# Patient Record
(demographics unavailable — no encounter records)

---

## 2024-11-11 NOTE — HISTORY OF PRESENT ILLNESS
[de-identified] : 46 y/o female with pmhx of T2Dm, HLD, cervical radiculopathy, anxiety, COPD, current smoker presents for follow up. Patient states she is overall doing well. She is still not smoking. She is interested in increasing her dose of mounjaro to help with weight loss. She does admit to some constipation with the medication

## 2024-11-11 NOTE — PHYSICAL EXAM
[No Edema] : there was no peripheral edema [No Focal Deficits] : no focal deficits [Normal] : affect was normal and insight and judgment were intact [de-identified] : 5/5 b/l upper extremity muscle strength

## 2024-11-11 NOTE — ASSESSMENT
[FreeTextEntry1] : cervical radiculopathy in right arm and now left arm continue PT continue meloxicam 15mg as needed  Type 2 DM will increase mounjaro to 12.5mg weekly advised to start benefiber daily for constipation will check HgbA1c today, to be drawn in office albumin/creatinine ratio - normal 12/2023 -pt to follow up in 3 months  Anxiety  now stable, continue paroxetine 20mg daily  HLD will check lipid panel